# Patient Record
Sex: MALE | Race: WHITE
[De-identification: names, ages, dates, MRNs, and addresses within clinical notes are randomized per-mention and may not be internally consistent; named-entity substitution may affect disease eponyms.]

---

## 2018-08-08 ENCOUNTER — HOSPITAL ENCOUNTER (EMERGENCY)
Dept: HOSPITAL 68 - ERH | Age: 38
End: 2018-08-08
Payer: COMMERCIAL

## 2018-08-08 VITALS — WEIGHT: 174 LBS | BODY MASS INDEX: 26.37 KG/M2 | HEIGHT: 68 IN

## 2018-08-08 VITALS — SYSTOLIC BLOOD PRESSURE: 114 MMHG | DIASTOLIC BLOOD PRESSURE: 49 MMHG

## 2018-08-08 DIAGNOSIS — S61.451A: Primary | ICD-10-CM
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
## 2018-08-08 NOTE — ED ANIMAL BITE/WOUND CHECK
History of Present Illness
 
General
Chief Complaint: Laceration Procedure
Stated Complaint: LAC/DOG BITE,DOG UP TO DATE WITH SHOTS
Source: patient
Exam Limitations: no limitations
 
Vital Signs & Intake/Output
Vital Signs & Intake/Output
 Vital Signs
 
 
Date Time Temp Pulse Resp B/P B/P Pulse O2 O2 Flow FiO2
 
     Mean Ox Delivery Rate 
 
08/08 0208 98.8 78 18   97 Room Air  
 
 
 
Allergies
Coded Allergies:
No Known Allergies (07/19/18)
 
Reconcile Medications
Amoxicillin/Potassium Clav (Augmentin 875-125 Tablet) 875 MG-125 MG TABLET   1 
TAB PO BID dog bite laceration
     PLease take this pill twice a day for 5 days 
Hydrocortisone (Preparation H) 1 % CREAM..G.   1 NOHEMY MI BID HEMORRHOIDS
Ibuprofen 800 MG TABLET   1 TAB PO TID PRN PAIN 
Psyllium Hydrophy Sugar Free (Metamucil Fiber Singles Packet) 3.4 GRAM POWD.PACK
  1 PAC PO BID PRN CONSTIPATION
 
Triage Note:
PT TO ED C/O DOG BITE TO TOP OF RT HAND.  KNOWN
 DOG AND DOG IS CURRENT WITH IMMUNIZATIONS.  PT
 UNSURE OF LAST TETANUS.  BLEEDING CONTROLLED
Triage Nurses Notes Reviewed? yes
HPI:
38-year-old male no significant past medical history presented to the emergency 
department with a dog bite 1 day.
 
The patient lives with his girlfriend and her dog.  On a verbal argument with 
her dog jumped on the patient and bit him on the dorsum of his hand.  Patient 
came to the ED as it was a gaping laceration.  It is associated with the pain 5/
10.  The patient says that the dog is updated with all discharged and is not 
sick.
 
Patient complains of numbness/paresthesia in the right index finger due to an 
injury 2 weeks ago(tire falling on his hand) for that he was in the ED imaging 
was done everything was fine.
(Farhana ECHEVERRIA,Cait)
 
Past History
 
Travel History
Traveled to [place] past 21 day No
 
Medical History
Any Pertinent Medical History? see below for history
Neurological: NONE
Respiratory: asthma
Gastrointestinal: GERD
Psychiatric: anxiety, depression
 
Surgical History
Surgical History: non-contributory
 
Psychosocial History
What is your primary language English
Tobacco Use: Current Not Daily
ETOH Use: denies use
Illicit Drug Use: denies illicit drug use
 
Family History
Hx Contributory? No
(Cait Delcid MD)
 
Review of Systems
 
Review of Systems
Constitutional:
Reports: no symptoms. 
EENTM:
Reports: no symptoms. 
Respiratory:
Reports: no symptoms. 
Cardiovascular:
Reports: no symptoms. 
GI:
Reports: no symptoms. 
Genitourinary:
Reports: no symptoms. 
Musculoskeletal:
Reports: no symptoms. 
Skin:
Reports: no symptoms. 
Neurological/Psychological:
Reports: no symptoms. 
Hematologic/Endocrine:
Reports: no symptoms. 
Immunologic/Allergic:
Reports: no symptoms. 
(Cait Delcid MD)
 
Physical Exam
 
Physical Exam
General Appearance: well developed/nourished, no apparent distress, alert, awake
, comfortable
Head: atraumatic, normal appearance
Eyes:
Bilateral: normal appearance. 
Ears, Nose, Throat: normal pharynx, normal ENT inspection
Neck: normal inspection, supple
Respiratory: normal breath sounds, chest non-tender, lungs clear
Cardiovascular: regular rate/rhythm
Gastrointestinal: normal bowel sounds, soft, non-tender
Extremities: normal range of motion, evidence of injury, 3 cm long laceration 
dorsum of rt hand 
Neurologic/Psych: no motor/sensory deficits, awake, alert, oriented x 3, normal 
gait
Skin: normal color, warm/dry
(Cait Delcid MD)
 
Progress
Differential Diagnosis: laceration
Plan of Care:
Pt sutured with 5.0. 
5 stitches were placed 
Initial ED EKG: none
(Cait Delcid MD)
Comments:
Wound sutured with 5  interrupted sutures of 5. 0 nylon.  Patient declined x-ray
to rule out fracture or foreign body.
(Ladan ECHEVERRIA,Demetrius ANDREWS)
 
Departure
 
Departure
Disposition: HOME OR SELF CARE
Condition: Stable
Clinical Impression
Primary Impression: Dog bite of right hand
Referrals:
Yamile De La Torre MD (PCP/Family)
 
Additional Instructions:
-Please keep the area of the wound dry
-Please return to the emergency department status worsening pain/redness/
swelling/discharge of the wound; fever/chills
-Please get your stitches removed after 12 days.  5 stitches were applied in the
emergency dept
 
Departure Forms:
Customer Survey
General Discharge Information
Prescriptions:
Current Visit Scripts
Amoxicillin/Potassium Clav (Augmentin 875-125 Tablet) 1 TAB PO BID  
     #10 TAB 
     PLease take this pill twice a day for 5 days 
 
 
(Cait Delcid MD)
 
Resident Co-Sign Statement
Statement:
ED Attending supervision documentation-
 
[X] I saw and evaluated the patient. I have also reviewed all the pertinent lab 
results and diagnostic results. I agree with the findings and the plan of care 
as documented in the Resident's documentation. 
 
[] I have reviewed the ED Record and agree with the Resident's documentation.
 
[] Additions or exceptions (if any) to the Resident's note and plan are 
summarized below:
[]
 
(Ladan ECHEVERRIA,Demetrius ANDREWS)
 
ED Attending Observation
Initial Observation Note:
I have seen and personally examined ZACH CUMMINS 
on 08/08/18 at 0545. 
 
I agree with the current emergency department documentation.  The disposition 
(admission or discharge) is uncertain at this time, he needs a period of 
observation for the following reason(s): 
 
The ED Nurse caring for this patient has been personally informed as to what the
patient is being observed for.
 
(Farhana ECHEVERRIA,Cait)